# Patient Record
Sex: MALE | URBAN - METROPOLITAN AREA
[De-identification: names, ages, dates, MRNs, and addresses within clinical notes are randomized per-mention and may not be internally consistent; named-entity substitution may affect disease eponyms.]

---
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
## 2025-06-16 NOTE — PROCEDURE: ISOTRETINOIN MONITORING
SIGN ORDER    I spoke to the patient.   Ipledge Number (Optional): [id number] Ipledge Number (Optional): [id number]